# Patient Record
Sex: FEMALE | Race: WHITE
[De-identification: names, ages, dates, MRNs, and addresses within clinical notes are randomized per-mention and may not be internally consistent; named-entity substitution may affect disease eponyms.]

---

## 2020-01-23 ENCOUNTER — HOSPITAL ENCOUNTER (OUTPATIENT)
Dept: HOSPITAL 47 - RADMAMWWP | Age: 60
Discharge: HOME | End: 2020-01-23
Attending: FAMILY MEDICINE
Payer: COMMERCIAL

## 2020-01-23 DIAGNOSIS — Z12.31: Primary | ICD-10-CM

## 2020-01-23 PROCEDURE — 77067 SCR MAMMO BI INCL CAD: CPT

## 2020-01-23 PROCEDURE — 77063 BREAST TOMOSYNTHESIS BI: CPT

## 2020-01-24 NOTE — MM
Reason for exam: screening  (asymptomatic).

Last mammogram was performed 8 years ago.



History:

Patient is postmenopausal.

2 cyst aspirations of the left breast.  Cyst aspiration of the right breast.

Took hormonal contraceptives for 15 years.



Physical Findings:

A clinical breast exam by your physician is recommended on an annual basis and 

results should be correlated with mammographic findings.



MG 3D Screening Mammo W/Cad

Bilateral CC and MLO view(s) were taken.

Prior study comparison: January 20, 2012, CAD bilateral diagnostic mammogram.  

September 24, 2010, bilateral digital screening mammogram.

The breast tissue is heterogeneously dense. This may lower the sensitivity of 

mammography.

Finding #1: There is a 6 mm oval mass in the lower outer quadrant of the right 

breast.

Finding #2: There are typically benign dystrophic, round calcifications in both 

breasts. There is a chronic nodularity in the right breast. There is no discrete 

abnormality.





ASSESSMENT: Incomplete: need additional imaging evaluation, BI-RAD 0



RECOMMENDATION:

Ultrasound of the right breast.



[facility] will attempt to contact patient  to return for ultrasound.

## 2020-07-01 ENCOUNTER — HOSPITAL ENCOUNTER (OUTPATIENT)
Dept: HOSPITAL 47 - RADUSWWP | Age: 60
Discharge: HOME | End: 2020-07-01
Attending: FAMILY MEDICINE
Payer: COMMERCIAL

## 2020-07-01 DIAGNOSIS — R92.8: Primary | ICD-10-CM

## 2020-07-01 NOTE — USB
Reason for exam: additional evaluation requested from abnormal screening.



History:

Patient is postmenopausal.

2 cyst aspirations of the left breast.  Cyst aspiration of the right breast.

Took hormonal contraceptives for 15 years.



Physical Findings:

Nurse Summary: 0.25cm movable nodule 10 o'clock (nurse dw).



US Breast Workup Limited RT

Right limited breast ultrasound including focal area of concern, retroareolar and 

axilla demonstrates a 4 x 3 x 5mm oval, solid lesion, questionable lymph node at 9

o'clock, a 4 x 4 x 4mm oval, cystic lesion at 10 o'clock BB and duct ectasia at 

the posterior nipple.



These results were verbally communicated with the patient and result sheet given 

to the patient on 7/1/20.





ASSESSMENT: Probably benign, BI-RAD 3



RECOMMENDATION:

Ultrasound of the right breast in 6 months.